# Patient Record
Sex: FEMALE | Race: WHITE | URBAN - METROPOLITAN AREA
[De-identification: names, ages, dates, MRNs, and addresses within clinical notes are randomized per-mention and may not be internally consistent; named-entity substitution may affect disease eponyms.]

---

## 2018-04-30 ENCOUNTER — TELEPHONE (OUTPATIENT)
Dept: FAMILY MEDICINE | Facility: OTHER | Age: 58
End: 2018-04-30

## 2018-04-30 NOTE — TELEPHONE ENCOUNTER
Has a PCP in the [facility] area.Reccomend that she make appt. With PCP.Verbalized understanding.

## 2018-04-30 NOTE — TELEPHONE ENCOUNTER
Angelina got a tick bite two days ago and has some questions regarding it.  She states Carolannalessandro Tucker saw her a three years ago for a tick bite and was wondering if she could get the same medication?